# Patient Record
Sex: FEMALE | Race: BLACK OR AFRICAN AMERICAN | NOT HISPANIC OR LATINO | Employment: OTHER | URBAN - METROPOLITAN AREA
[De-identification: names, ages, dates, MRNs, and addresses within clinical notes are randomized per-mention and may not be internally consistent; named-entity substitution may affect disease eponyms.]

---

## 2021-10-29 PROBLEM — I10 UNCONTROLLED HYPERTENSION: Chronic | Status: ACTIVE | Noted: 2021-10-29

## 2021-10-29 PROBLEM — E87.6 HYPOKALEMIA: Chronic | Status: ACTIVE | Noted: 2021-10-29

## 2021-10-29 PROBLEM — E87.6 HYPOKALEMIA: Status: ACTIVE | Noted: 2021-10-29

## 2021-10-29 PROBLEM — Z79.899 ENCOUNTER FOR LONG-TERM CURRENT USE OF MEDICATION: Status: ACTIVE | Noted: 2021-10-29

## 2021-10-29 PROBLEM — F32.A DEPRESSION: Status: ACTIVE | Noted: 2021-10-29

## 2021-10-29 PROBLEM — E87.1 HYPONATREMIA: Status: ACTIVE | Noted: 2021-10-29

## 2021-10-29 PROBLEM — F32.A DEPRESSION: Chronic | Status: ACTIVE | Noted: 2021-10-29

## 2021-10-29 PROBLEM — I10 UNCONTROLLED HYPERTENSION: Status: ACTIVE | Noted: 2021-10-29

## 2021-10-29 PROBLEM — E87.1 HYPONATREMIA: Chronic | Status: ACTIVE | Noted: 2021-10-29

## 2022-02-16 PROBLEM — F20.9 SCHIZOPHRENIA: Chronic | Status: ACTIVE | Noted: 2022-02-16

## 2022-02-16 PROBLEM — F41.9 ANXIETY: Chronic | Status: ACTIVE | Noted: 2022-02-16

## 2022-02-16 PROBLEM — F19.11 HISTORY OF SUBSTANCE ABUSE: Status: ACTIVE | Noted: 2022-02-16

## 2022-03-02 PROBLEM — F41.9 ANXIETY: Chronic | Status: RESOLVED | Noted: 2022-02-16 | Resolved: 2022-03-02

## 2022-03-14 PROBLEM — E66.9 OBESITY: Status: ACTIVE | Noted: 2022-03-14

## 2022-05-03 ENCOUNTER — EXTERNAL HOME HEALTH (OUTPATIENT)
Dept: HOME HEALTH SERVICES | Facility: HOSPITAL | Age: 67
End: 2022-05-03

## 2022-12-14 PROBLEM — Z00.00 PREVENTATIVE HEALTH CARE: Status: ACTIVE | Noted: 2021-10-29

## 2022-12-14 PROBLEM — Z12.31 ENCOUNTER FOR SCREENING MAMMOGRAM FOR MALIGNANT NEOPLASM OF BREAST: Status: ACTIVE | Noted: 2021-10-29

## 2022-12-14 PROBLEM — M19.90 ARTHRITIS: Status: ACTIVE | Noted: 2022-12-14

## 2023-04-03 ENCOUNTER — PATIENT OUTREACH (OUTPATIENT)
Dept: ADMINISTRATIVE | Facility: HOSPITAL | Age: 68
End: 2023-04-03

## 2023-04-03 DIAGNOSIS — F17.210 CIGARETTE SMOKER: Primary | ICD-10-CM

## 2023-06-15 PROBLEM — Z12.11 COLON CANCER SCREENING: Status: ACTIVE | Noted: 2021-10-29

## 2023-06-15 PROBLEM — Z79.899 ON POTASSIUM SPARING DIURETIC THERAPY: Status: ACTIVE | Noted: 2023-06-15

## 2023-06-15 PROBLEM — K21.9 GASTROESOPHAGEAL REFLUX DISEASE WITHOUT ESOPHAGITIS: Status: ACTIVE | Noted: 2023-06-15

## 2023-06-30 ENCOUNTER — PATIENT OUTREACH (OUTPATIENT)
Dept: ADMINISTRATIVE | Facility: HOSPITAL | Age: 68
End: 2023-06-30

## 2023-06-30 DIAGNOSIS — Z12.12 SCREENING FOR COLORECTAL CANCER: Primary | ICD-10-CM

## 2023-06-30 DIAGNOSIS — Z12.11 SCREENING FOR COLORECTAL CANCER: Primary | ICD-10-CM

## 2023-08-04 PROBLEM — Z79.899 LONG TERM USE OF DRUG: Status: ACTIVE | Noted: 2023-08-04

## 2023-11-22 PROBLEM — F41.9 ANXIETY: Chronic | Status: RESOLVED | Noted: 2022-02-16 | Resolved: 2023-11-22

## 2023-12-15 PROBLEM — Z00.00 PREVENTATIVE HEALTH CARE: Status: ACTIVE | Noted: 2023-08-04

## 2024-03-07 PROBLEM — F19.11 HISTORY OF SUBSTANCE ABUSE: Status: RESOLVED | Noted: 2022-02-16 | Resolved: 2024-03-07

## 2024-03-18 PROBLEM — Z00.00 PREVENTATIVE HEALTH CARE: Status: RESOLVED | Noted: 2023-08-04 | Resolved: 2024-03-18

## 2024-04-15 PROBLEM — G40.89 OTHER SEIZURES: Status: ACTIVE | Noted: 2024-04-15

## 2024-06-24 PROBLEM — K21.9 GASTROESOPHAGEAL REFLUX DISEASE WITHOUT ESOPHAGITIS: Chronic | Status: ACTIVE | Noted: 2023-06-15

## 2024-06-24 PROBLEM — Z23 NEED FOR PROPHYLACTIC VACCINATION WITH STREPTOCOCCUS PNEUMONIAE (PNEUMOCOCCUS) AND INFLUENZA VACCINES: Status: ACTIVE | Noted: 2024-06-24

## 2024-06-24 PROBLEM — Z79.899 ON POTASSIUM SPARING DIURETIC THERAPY: Chronic | Status: ACTIVE | Noted: 2023-06-15

## 2024-06-24 PROBLEM — M19.90 ARTHRITIS: Chronic | Status: ACTIVE | Noted: 2022-12-14

## 2024-06-24 PROBLEM — E87.1 HYPONATREMIA: Chronic | Status: RESOLVED | Noted: 2021-10-29 | Resolved: 2024-06-24

## 2024-06-24 PROBLEM — E87.6 HYPOKALEMIA: Chronic | Status: RESOLVED | Noted: 2021-10-29 | Resolved: 2024-06-24

## 2024-07-15 PROBLEM — Z00.00 ENCOUNTER FOR HEALTH MAINTENANCE EXAMINATION IN ADULT: Status: RESOLVED | Noted: 2021-10-29 | Resolved: 2024-07-15

## 2024-09-27 PROBLEM — F19.11 HISTORY OF SUBSTANCE ABUSE: Chronic | Status: RESOLVED | Noted: 2022-02-16 | Resolved: 2024-03-07

## 2024-09-27 PROBLEM — F19.11 HISTORY OF SUBSTANCE ABUSE: Chronic | Status: ACTIVE | Noted: 2022-02-16

## 2024-12-27 PROBLEM — Z23 NEED FOR PROPHYLACTIC VACCINATION WITH STREPTOCOCCUS PNEUMONIAE (PNEUMOCOCCUS) AND INFLUENZA VACCINES: Status: RESOLVED | Noted: 2024-06-24 | Resolved: 2024-12-27

## 2024-12-27 PROBLEM — F19.11 HISTORY OF SUBSTANCE ABUSE: Chronic | Status: RESOLVED | Noted: 2022-02-16 | Resolved: 2024-12-27

## 2024-12-27 PROBLEM — G40.89 OTHER SEIZURES: Chronic | Status: ACTIVE | Noted: 2024-04-15

## 2024-12-27 PROBLEM — E66.9 OBESITY: Status: RESOLVED | Noted: 2022-03-14 | Resolved: 2024-12-27

## 2025-02-25 PROBLEM — G93.41 SEPSIS WITH ENCEPHALOPATHY WITHOUT SEPTIC SHOCK: Status: ACTIVE | Noted: 2025-02-25

## 2025-02-25 PROBLEM — A41.9 SEPSIS WITHOUT ACUTE ORGAN DYSFUNCTION: Status: ACTIVE | Noted: 2025-02-25

## 2025-02-25 PROBLEM — R65.20 SEPSIS WITH ENCEPHALOPATHY WITHOUT SEPTIC SHOCK: Status: ACTIVE | Noted: 2025-02-25

## 2025-02-25 PROBLEM — Z91.89 AT RISK FOR ELDER ABUSE: Status: ACTIVE | Noted: 2025-02-25

## 2025-02-26 PROBLEM — G40.A09 SEIZURE, PETIT MAL: Status: ACTIVE | Noted: 2024-04-15

## 2025-02-26 PROBLEM — N39.0 URINARY TRACT INFECTION WITHOUT HEMATURIA: Status: ACTIVE | Noted: 2025-02-26

## 2025-02-27 PROBLEM — R56.9 SEIZURE: Status: ACTIVE | Noted: 2025-02-27

## 2025-02-27 PROBLEM — G03.9 MENINGITIS: Status: ACTIVE | Noted: 2025-02-27

## 2025-02-28 PROBLEM — R78.81 BACTEREMIA DUE TO CLOSTRIDIUM SPECIES: Status: ACTIVE | Noted: 2025-02-28

## 2025-02-28 PROBLEM — B96.89 BACTEREMIA DUE TO CLOSTRIDIUM SPECIES: Status: ACTIVE | Noted: 2025-02-28

## 2025-03-01 PROBLEM — R09.02 OXYGEN DESATURATION: Status: ACTIVE | Noted: 2025-03-01

## 2025-03-01 PROBLEM — B96.7 BACTERIAL INFECTION DUE TO CLOSTRIDIUM PERFRINGENS: Status: ACTIVE | Noted: 2025-03-01

## 2025-03-01 PROBLEM — Z97.8 ENDOTRACHEALLY INTUBATED: Status: ACTIVE | Noted: 2025-03-01

## 2025-03-01 PROBLEM — R65.21 SEPTIC SHOCK: Status: ACTIVE | Noted: 2025-02-25

## 2025-03-01 PROBLEM — A59.9 TRICHOMONAS INFECTION: Status: ACTIVE | Noted: 2025-03-01

## 2025-03-01 PROBLEM — J96.01 ACUTE HYPOXIC RESPIRATORY FAILURE: Status: ACTIVE | Noted: 2025-03-01

## 2025-03-01 PROBLEM — G93.40 ACUTE ENCEPHALOPATHY: Status: ACTIVE | Noted: 2025-03-01

## 2025-03-01 PROBLEM — J98.8 AIRWAY COMPROMISE: Status: ACTIVE | Noted: 2025-03-01

## 2025-03-01 PROBLEM — G40.901 STATUS EPILEPTICUS: Status: ACTIVE | Noted: 2025-03-01

## 2025-03-02 PROBLEM — G03.9 MENINGITIS: Status: RESOLVED | Noted: 2025-02-27 | Resolved: 2025-03-02

## 2025-03-05 PROBLEM — J96.01 ACUTE HYPOXIC RESPIRATORY FAILURE: Status: RESOLVED | Noted: 2025-03-01 | Resolved: 2025-03-05

## 2025-03-05 PROBLEM — A53.0 LATENT SYPHILIS: Status: ACTIVE | Noted: 2025-03-05

## 2025-03-06 PROBLEM — A52.3 NEUROSYPHILIS: Status: ACTIVE | Noted: 2025-03-06

## 2025-03-13 PROBLEM — R09.02 OXYGEN DESATURATION: Status: RESOLVED | Noted: 2025-03-01 | Resolved: 2025-03-13

## 2025-03-21 ENCOUNTER — PATIENT OUTREACH (OUTPATIENT)
Dept: ADMINISTRATIVE | Facility: CLINIC | Age: 70
End: 2025-03-21

## 2025-03-21 NOTE — PROGRESS NOTES
C3 nurse attempted to contact Meryl Martínez for a TCC post hospital discharge follow up call. No answer. Left voicemail with callback information. The patient has a scheduled HOSFU appointment with Transitional Care Unit on 03/31/2025 @ 1000.